# Patient Record
Sex: FEMALE | NOT HISPANIC OR LATINO | Employment: FULL TIME | URBAN - METROPOLITAN AREA
[De-identification: names, ages, dates, MRNs, and addresses within clinical notes are randomized per-mention and may not be internally consistent; named-entity substitution may affect disease eponyms.]

---
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
## 2024-02-28 ENCOUNTER — HOSPITAL ENCOUNTER (EMERGENCY)
Facility: CLINIC | Age: 19
Discharge: HOME | End: 2024-02-28
Attending: EMERGENCY MEDICINE | Admitting: EMERGENCY MEDICINE
Payer: COMMERCIAL

## 2024-02-28 VITALS
OXYGEN SATURATION: 100 % | SYSTOLIC BLOOD PRESSURE: 162 MMHG | TEMPERATURE: 101.8 F | DIASTOLIC BLOOD PRESSURE: 99 MMHG | RESPIRATION RATE: 20 BRPM | HEART RATE: 114 BPM

## 2024-02-28 DIAGNOSIS — J11.1 INFLUENZA-LIKE ILLNESS: ICD-10-CM

## 2024-02-28 DIAGNOSIS — J45.20 MILD INTERMITTENT ASTHMA WITHOUT COMPLICATION: ICD-10-CM

## 2024-02-28 LAB
FLUAV RNA SPEC QL NAA+PROBE: POSITIVE
FLUBV RNA RESP QL NAA+PROBE: NEGATIVE
RSV RNA SPEC NAA+PROBE: NEGATIVE
SARS-COV-2 RNA RESP QL NAA+PROBE: NEGATIVE

## 2024-02-28 PROCEDURE — G0463 HOSPITAL OUTPT CLINIC VISIT: HCPCS | Performed by: EMERGENCY MEDICINE

## 2024-02-28 PROCEDURE — 87637 SARSCOV2&INF A&B&RSV AMP PRB: CPT | Performed by: NURSE PRACTITIONER

## 2024-02-28 PROCEDURE — 99213 OFFICE O/P EST LOW 20 MIN: CPT | Performed by: EMERGENCY MEDICINE

## 2024-02-28 RX ORDER — ACETAMINOPHEN 500 MG
500-1000 TABLET ORAL EVERY 6 HOURS PRN
Qty: 60 TABLET | Refills: 0 | Status: SHIPPED | OUTPATIENT
Start: 2024-02-28

## 2024-02-28 RX ORDER — ALBUTEROL SULFATE 90 UG/1
2 AEROSOL, METERED RESPIRATORY (INHALATION) EVERY 6 HOURS PRN
Qty: 18 G | Refills: 0 | Status: SHIPPED | OUTPATIENT
Start: 2024-02-28

## 2024-02-28 RX ORDER — INHALER, ASSIST DEVICES
1 SPACER (EA) MISCELLANEOUS ONCE
Status: DISCONTINUED | OUTPATIENT
Start: 2024-02-28 | End: 2024-02-28 | Stop reason: HOSPADM

## 2024-02-28 RX ORDER — IBUPROFEN 200 MG
400 TABLET ORAL EVERY 4 HOURS PRN
Qty: 60 TABLET | Refills: 0 | Status: SHIPPED | OUTPATIENT
Start: 2024-02-28

## 2024-02-28 ASSESSMENT — COLUMBIA-SUICIDE SEVERITY RATING SCALE - C-SSRS
1. IN THE PAST MONTH, HAVE YOU WISHED YOU WERE DEAD OR WISHED YOU COULD GO TO SLEEP AND NOT WAKE UP?: NO
2. HAVE YOU ACTUALLY HAD ANY THOUGHTS OF KILLING YOURSELF IN THE PAST MONTH?: NO
6. HAVE YOU EVER DONE ANYTHING, STARTED TO DO ANYTHING, OR PREPARED TO DO ANYTHING TO END YOUR LIFE?: NO

## 2024-02-28 ASSESSMENT — ACTIVITIES OF DAILY LIVING (ADL): ADLS_ACUITY_SCORE: 35

## 2024-02-29 ENCOUNTER — TELEPHONE (OUTPATIENT)
Dept: EMERGENCY MEDICINE | Facility: CLINIC | Age: 19
End: 2024-02-29
Payer: COMMERCIAL

## 2024-02-29 NOTE — DISCHARGE INSTRUCTIONS
You likely have influenza as a cause of your symptoms.  Influenza is a viral illness.  Unfortunately there are no medications that can help this get better sooner.  Drink plenty of fluids and get some rest.  Use acetaminophen 500-1000 mg and ibuprofen 400 mg every 6 hours as needed for symptoms.  Use your albuterol with the spacer to help with your cough.  Return if you are having worsening symptoms or other concerns, otherwise follow-up in clinic.

## 2024-02-29 NOTE — TELEPHONE ENCOUNTER
"[facility]    Reason for call: Lab Result Notification     Lab Result (including Rx patient on, if applicable).  If culture, copy of lab report at bottom.  Lab Result: Influenza A/B & SARS-COV2 (Covid-19) virus PCR mulitplex is positive for INFLUENZA A  Covid19 result is negative.  Patient will receive the Covid19 result via [employer] and a letter will be sent via [employer] (if active) or via the mail   Patient to be notified of Positive Influenza result and advised per [facility] Respiratory Virus Panel or Influenza A/B antigen protocol.     Creatinine Level (mg/dl) No results found for: \"CR\" Creatinine clearance (ml/min), if applicable    Creatinine clearance cannot be calculated (No successful lab value found.)     Patient's current Symptoms:   12:15P.  Not really achy.  I believe my fever broke last night  I'm feeling better today  Has a mild headache (2 to 3 on a scale of 10)  Was a little nauseated earlier today but not now  No breathing problems  Has developed diarrhea after leaving the hospital.  Since this morning I have had 3 diarrhea stools.    Urinated once around 10AM.    Has been in bed, sleeping most of the day  Had an episode of feeling lightheaded and \"almost past out when in the bathroom\"     RN Recommendations/Instructions per [place] ED lab result protocol:   [facility] ED lab result protocol utilized: respiratory viral illness  Sent information about influenza via [employer]  Reviewed information about influenza    Patient/care giver notified to contact your PCP clinic or return to the Emergency department if your:  Symptoms worsen or other concerning symptoms.    Juan Dillon RN   "

## 2024-02-29 NOTE — RESULT ENCOUNTER NOTE
Influenza A/B & SARS-COV2 (Covid-19) virus PCR mulitplex is positive for INFLUENZA A  Covid19 result is negative.  Patient will receive the Covid19 result via [employer] and a letter will be sent via [employer] (if active) or via the mail   Patient to be notified of Positive Influenza result and advised per [facility] Respiratory Virus Panel or Influenza A/B antigen protocol.

## 2024-02-29 NOTE — ED PROVIDER NOTES
History     Chief Complaint   Patient presents with    Flu Symptoms     Chills, headache , vomiting   Last dose of tylenol at 11 am today        HPI  Jeannette Artis is a 18 year old female who presents for fever, chills, rigors, cough, runny nose.  Symptoms started 3 days ago, dates had some vomiting initially but none currently.  No diarrhea.  She has been out of her albuterol.  She has a history of asthma.    Allergies:  No Known Allergies    Problem List:    There are no problems to display for this patient.       Past Medical History:    No past medical history on file.    Past Surgical History:    No past surgical history on file.    Family History:    No family history on file.    Social History:  Marital Status:  Single [1]        Medications:    acetaminophen (TYLENOL) 500 MG tablet  albuterol (PROAIR HFA/PROVENTIL HFA/VENTOLIN HFA) 108 (90 Base) MCG/ACT inhaler  ibuprofen (ADVIL/MOTRIN) 200 MG tablet  predniSONE (DELTASONE) 20 MG tablet  venlafaxine (EFFEXOR XR) 75 MG 24 hr capsule          Review of Systems    Physical Exam   BP: (!) 162/99  Pulse: 114  Temp: (!) 101.8  F (38.8  C)  Resp: 20  SpO2: 100 %      Physical Exam  Constitutional:       General: She is not in acute distress.     Appearance: Normal appearance. She is well-developed.   HENT:      Head: Normocephalic and atraumatic.      Mouth/Throat:      Tonsils: No tonsillar exudate or tonsillar abscesses. 1+ on the right. 1+ on the left.   Eyes:      General: No scleral icterus.     Conjunctiva/sclera: Conjunctivae normal.   Cardiovascular:      Rate and Rhythm: Tachycardia present.      Heart sounds: No murmur heard.  Pulmonary:      Effort: Pulmonary effort is normal. No respiratory distress.      Breath sounds: Wheezing present.   Abdominal:      General: Abdomen is flat.   Musculoskeletal:      Cervical back: Normal range of motion and neck supple.   Skin:     General: Skin is warm and dry.      Findings: No rash.   Neurological:      Mental  Status: She is alert and oriented to person, place, and time.         ED Course        Procedures              Critical Care time:  none               No results found for this or any previous visit (from the past 24 hour(s)).    Medications   aerochamber with mouthpiece (NO mask) - > 5 years 1 each (has no administration in time range)       Assessments & Plan (with Medical Decision Making)   18-year-old female with a history of asthma who presents for fever, cough, rigors, sore throat, runny nose.  She has some mild wheezing on auscultation of her lungs.  She says that she feels like she just needs a refill of her albuterol to help with this, does not think that she needs steroid prescription at this time.  I will give her a spacer to use with her inhaler.  She is nontoxic and well-appearing.  Tolerating oral intake here.  She likely has influenza as a cause of her symptoms and she is safe to discharge with a prescription for acetaminophen, ibuprofen, and a refill of her albuterol.  She is told to return if worse, otherwise follow-up in clinic.  The patient is in agreement to this plan.    I have reviewed the nursing notes.    I have reviewed the findings, diagnosis, plan and need for follow up with the patient.               New Prescriptions    ACETAMINOPHEN (TYLENOL) 500 MG TABLET    Take 1-2 tablets (500-1,000 mg) by mouth every 6 hours as needed for mild pain    ALBUTEROL (PROAIR HFA/PROVENTIL HFA/VENTOLIN HFA) 108 (90 BASE) MCG/ACT INHALER    Inhale 2 puffs into the lungs every 6 hours as needed for shortness of breath, wheezing or cough    IBUPROFEN (ADVIL/MOTRIN) 200 MG TABLET    Take 2 tablets (400 mg) by mouth every 4 hours as needed for mild pain       Final diagnoses:   Influenza-like illness   Mild intermittent asthma without complication       2/28/2024   [facility] EMERGENCY DEPT       Bi Santos MD  02/28/24 [id number]

## 2024-03-10 ENCOUNTER — HEALTH MAINTENANCE LETTER (OUTPATIENT)
Age: 19
End: 2024-03-10

## 2025-03-16 ENCOUNTER — HEALTH MAINTENANCE LETTER (OUTPATIENT)
Age: 20
End: 2025-03-16